# Patient Record
Sex: FEMALE | Race: OTHER | URBAN - METROPOLITAN AREA
[De-identification: names, ages, dates, MRNs, and addresses within clinical notes are randomized per-mention and may not be internally consistent; named-entity substitution may affect disease eponyms.]

---

## 2022-10-19 LAB
AMB EXT GLUCOSE 1HR OB: 111
ANTIBODY SCREEN OB: NEGATIVE
HEPATITIS B SURFACE ANTIGEN OB: NEGATIVE
HIV RESULT OB: NEGATIVE
RAPID PLASMA REAGIN OB: NONREACTIVE
RH FACTOR OB: POSITIVE

## 2022-12-24 ENCOUNTER — HOSPITAL ENCOUNTER (INPATIENT)
Facility: HOSPITAL | Age: 22
LOS: 3 days | Discharge: HOME | End: 2022-12-27
Attending: OBSTETRICS & GYNECOLOGY | Admitting: OBSTETRICS & GYNECOLOGY
Payer: MEDICAID

## 2022-12-24 PROBLEM — Z34.90 PREGNANCY: Status: ACTIVE | Noted: 2022-12-24

## 2022-12-24 LAB
BASOPHILS # BLD AUTO: 0.06 X10(3) UL (ref 0–0.2)
BASOPHILS NFR BLD AUTO: 0.5 %
DEPRECATED RDW RBC AUTO: 43.2 FL (ref 35.1–46.3)
EOSINOPHIL # BLD AUTO: 0.26 X10(3) UL (ref 0–0.7)
EOSINOPHIL NFR BLD AUTO: 2 %
ERYTHROCYTE [DISTWIDTH] IN BLOOD BY AUTOMATED COUNT: 13.2 % (ref 11–15)
HCT VFR BLD AUTO: 36.8 %
HGB BLD-MCNC: 12 G/DL
IMM GRANULOCYTES # BLD AUTO: 0.06 X10(3) UL (ref 0–1)
IMM GRANULOCYTES NFR BLD: 0.5 %
LYMPHOCYTES # BLD AUTO: 2 X10(3) UL (ref 1–4)
LYMPHOCYTES NFR BLD AUTO: 15.2 %
MCH RBC QN AUTO: 29.4 PG (ref 26–34)
MCHC RBC AUTO-ENTMCNC: 32.6 G/DL (ref 31–37)
MCV RBC AUTO: 90.2 FL
MONOCYTES # BLD AUTO: 0.88 X10(3) UL (ref 0.1–1)
MONOCYTES NFR BLD AUTO: 6.7 %
NEUTROPHILS # BLD AUTO: 9.87 X10 (3) UL (ref 1.5–7.7)
NEUTROPHILS # BLD AUTO: 9.87 X10(3) UL (ref 1.5–7.7)
NEUTROPHILS NFR BLD AUTO: 75.1 %
PLATELET # BLD AUTO: 339 10(3)UL (ref 150–450)
RBC # BLD AUTO: 4.08 X10(6)UL
WBC # BLD AUTO: 13.1 X10(3) UL (ref 4–11)

## 2022-12-24 PROCEDURE — 85025 COMPLETE CBC W/AUTO DIFF WBC: CPT | Performed by: OBSTETRICS & GYNECOLOGY

## 2022-12-24 PROCEDURE — 99204 OFFICE O/P NEW MOD 45 MIN: CPT

## 2022-12-24 PROCEDURE — 86900 BLOOD TYPING SEROLOGIC ABO: CPT | Performed by: OBSTETRICS & GYNECOLOGY

## 2022-12-24 PROCEDURE — 86901 BLOOD TYPING SEROLOGIC RH(D): CPT | Performed by: OBSTETRICS & GYNECOLOGY

## 2022-12-24 PROCEDURE — 86850 RBC ANTIBODY SCREEN: CPT | Performed by: OBSTETRICS & GYNECOLOGY

## 2022-12-24 RX ORDER — TRISODIUM CITRATE DIHYDRATE AND CITRIC ACID MONOHYDRATE 500; 334 MG/5ML; MG/5ML
30 SOLUTION ORAL AS NEEDED
Status: DISCONTINUED | OUTPATIENT
Start: 2022-12-24 | End: 2022-12-25

## 2022-12-24 RX ORDER — TERBUTALINE SULFATE 1 MG/ML
0.25 INJECTION, SOLUTION SUBCUTANEOUS AS NEEDED
Status: DISCONTINUED | OUTPATIENT
Start: 2022-12-24 | End: 2022-12-25

## 2022-12-24 RX ORDER — DEXTROSE, SODIUM CHLORIDE, SODIUM LACTATE, POTASSIUM CHLORIDE, AND CALCIUM CHLORIDE 5; .6; .31; .03; .02 G/100ML; G/100ML; G/100ML; G/100ML; G/100ML
INJECTION, SOLUTION INTRAVENOUS AS NEEDED
Status: DISCONTINUED | OUTPATIENT
Start: 2022-12-24 | End: 2022-12-25

## 2022-12-24 RX ORDER — SODIUM CHLORIDE, SODIUM LACTATE, POTASSIUM CHLORIDE, CALCIUM CHLORIDE 600; 310; 30; 20 MG/100ML; MG/100ML; MG/100ML; MG/100ML
INJECTION, SOLUTION INTRAVENOUS CONTINUOUS
Status: DISCONTINUED | OUTPATIENT
Start: 2022-12-24 | End: 2022-12-25

## 2022-12-24 RX ORDER — BUPIVACAINE HCL/0.9 % NACL/PF 0.25 %
5 PLASTIC BAG, INJECTION (ML) EPIDURAL AS NEEDED
Status: DISCONTINUED | OUTPATIENT
Start: 2022-12-24 | End: 2022-12-25

## 2022-12-24 RX ORDER — LIDOCAINE HYDROCHLORIDE 10 MG/ML
30 INJECTION, SOLUTION EPIDURAL; INFILTRATION; INTRACAUDAL; PERINEURAL ONCE
Status: DISCONTINUED | OUTPATIENT
Start: 2022-12-24 | End: 2022-12-25

## 2022-12-24 RX ORDER — CHOLECALCIFEROL (VITAMIN D3) 25 MCG
1 TABLET,CHEWABLE ORAL DAILY
COMMUNITY

## 2022-12-24 RX ORDER — BUPIVACAINE HYDROCHLORIDE 2.5 MG/ML
20 INJECTION, SOLUTION EPIDURAL; INFILTRATION; INTRACAUDAL ONCE
Status: DISCONTINUED | OUTPATIENT
Start: 2022-12-24 | End: 2022-12-25

## 2022-12-24 RX ORDER — SULFAMETHOXAZOLE AND TRIMETHOPRIM 800; 160 MG/1; MG/1
1 TABLET ORAL 2 TIMES DAILY
COMMUNITY
Start: 2022-12-23 | End: 2022-12-27

## 2022-12-24 RX ORDER — NALBUPHINE HCL 10 MG/ML
2.5 AMPUL (ML) INJECTION
Status: DISCONTINUED | OUTPATIENT
Start: 2022-12-24 | End: 2022-12-25

## 2022-12-24 RX ORDER — ONDANSETRON 2 MG/ML
4 INJECTION INTRAMUSCULAR; INTRAVENOUS EVERY 6 HOURS PRN
Status: DISCONTINUED | OUTPATIENT
Start: 2022-12-24 | End: 2022-12-25

## 2022-12-24 RX ORDER — IBUPROFEN 600 MG/1
600 TABLET ORAL EVERY 6 HOURS PRN
Status: DISCONTINUED | OUTPATIENT
Start: 2022-12-24 | End: 2022-12-25

## 2022-12-24 RX ORDER — AMMONIA INHALANTS 0.04 G/.3ML
0.3 INHALANT RESPIRATORY (INHALATION) AS NEEDED
Status: DISCONTINUED | OUTPATIENT
Start: 2022-12-24 | End: 2022-12-25

## 2022-12-24 RX ORDER — ACETAMINOPHEN 500 MG
500 TABLET ORAL EVERY 6 HOURS PRN
Status: DISCONTINUED | OUTPATIENT
Start: 2022-12-24 | End: 2022-12-25

## 2022-12-25 ENCOUNTER — ANESTHESIA (OUTPATIENT)
Dept: OBGYN UNIT | Facility: HOSPITAL | Age: 22
End: 2022-12-25
Payer: MEDICAID

## 2022-12-25 ENCOUNTER — ANESTHESIA EVENT (OUTPATIENT)
Dept: OBGYN UNIT | Facility: HOSPITAL | Age: 22
End: 2022-12-25
Payer: MEDICAID

## 2022-12-25 LAB
ANTIBODY SCREEN: NEGATIVE
RH BLOOD TYPE: POSITIVE
RH BLOOD TYPE: POSITIVE
SARS-COV-2 RNA RESP QL NAA+PROBE: NOT DETECTED

## 2022-12-25 PROCEDURE — 0UQGXZZ REPAIR VAGINA, EXTERNAL APPROACH: ICD-10-PCS | Performed by: OBSTETRICS & GYNECOLOGY

## 2022-12-25 RX ORDER — ACETAMINOPHEN 500 MG
500 TABLET ORAL EVERY 6 HOURS PRN
Status: DISCONTINUED | OUTPATIENT
Start: 2022-12-25 | End: 2022-12-27

## 2022-12-25 RX ORDER — LIDOCAINE HYDROCHLORIDE 10 MG/ML
INJECTION, SOLUTION EPIDURAL; INFILTRATION; INTRACAUDAL; PERINEURAL AS NEEDED
Status: DISCONTINUED | OUTPATIENT
Start: 2022-12-25 | End: 2022-12-25 | Stop reason: SURG

## 2022-12-25 RX ORDER — HYDROCODONE BITARTRATE AND ACETAMINOPHEN 5; 325 MG/1; MG/1
1 TABLET ORAL EVERY 6 HOURS PRN
Status: DISCONTINUED | OUTPATIENT
Start: 2022-12-25 | End: 2022-12-27

## 2022-12-25 RX ORDER — DIAPER,BRIEF,INFANT-TODD,DISP
1 EACH MISCELLANEOUS EVERY 6 HOURS PRN
Status: DISCONTINUED | OUTPATIENT
Start: 2022-12-25 | End: 2022-12-27

## 2022-12-25 RX ORDER — DOCUSATE SODIUM 100 MG/1
100 CAPSULE, LIQUID FILLED ORAL 2 TIMES DAILY
Status: DISCONTINUED | OUTPATIENT
Start: 2022-12-25 | End: 2022-12-27

## 2022-12-25 RX ORDER — BISACODYL 10 MG
10 SUPPOSITORY, RECTAL RECTAL ONCE AS NEEDED
Status: DISCONTINUED | OUTPATIENT
Start: 2022-12-25 | End: 2022-12-27

## 2022-12-25 RX ORDER — BUPIVACAINE HYDROCHLORIDE 2.5 MG/ML
20 INJECTION, SOLUTION EPIDURAL; INFILTRATION; INTRACAUDAL ONCE
Status: DISCONTINUED | OUTPATIENT
Start: 2022-12-25 | End: 2022-12-25

## 2022-12-25 RX ORDER — ACETAMINOPHEN 500 MG
1000 TABLET ORAL EVERY 6 HOURS PRN
Status: DISCONTINUED | OUTPATIENT
Start: 2022-12-25 | End: 2022-12-27

## 2022-12-25 RX ORDER — AMMONIA INHALANTS 0.04 G/.3ML
0.3 INHALANT RESPIRATORY (INHALATION) AS NEEDED
Status: DISCONTINUED | OUTPATIENT
Start: 2022-12-25 | End: 2022-12-27

## 2022-12-25 RX ORDER — ONDANSETRON 2 MG/ML
4 INJECTION INTRAMUSCULAR; INTRAVENOUS EVERY 6 HOURS PRN
Status: DISCONTINUED | OUTPATIENT
Start: 2022-12-25 | End: 2022-12-27

## 2022-12-25 RX ORDER — NALBUPHINE HCL 10 MG/ML
2.5 AMPUL (ML) INJECTION
Status: DISCONTINUED | OUTPATIENT
Start: 2022-12-25 | End: 2022-12-25

## 2022-12-25 RX ORDER — LIDOCAINE HYDROCHLORIDE AND EPINEPHRINE 15; 5 MG/ML; UG/ML
INJECTION, SOLUTION EPIDURAL AS NEEDED
Status: DISCONTINUED | OUTPATIENT
Start: 2022-12-25 | End: 2022-12-25 | Stop reason: SURG

## 2022-12-25 RX ORDER — SIMETHICONE 80 MG
80 TABLET,CHEWABLE ORAL 3 TIMES DAILY PRN
Status: DISCONTINUED | OUTPATIENT
Start: 2022-12-25 | End: 2022-12-27

## 2022-12-25 RX ORDER — IBUPROFEN 600 MG/1
600 TABLET ORAL EVERY 6 HOURS
Status: DISCONTINUED | OUTPATIENT
Start: 2022-12-25 | End: 2022-12-27

## 2022-12-25 RX ORDER — BUPIVACAINE HCL/0.9 % NACL/PF 0.25 %
5 PLASTIC BAG, INJECTION (ML) EPIDURAL AS NEEDED
Status: DISCONTINUED | OUTPATIENT
Start: 2022-12-25 | End: 2022-12-25

## 2022-12-25 RX ADMIN — LIDOCAINE HYDROCHLORIDE 5 ML: 10 INJECTION, SOLUTION EPIDURAL; INFILTRATION; INTRACAUDAL; PERINEURAL at 13:32:00

## 2022-12-25 RX ADMIN — LIDOCAINE HYDROCHLORIDE AND EPINEPHRINE 3 ML: 15; 5 INJECTION, SOLUTION EPIDURAL at 13:41:00

## 2022-12-25 NOTE — PLAN OF CARE
Problem: Patient Centered Care  Goal: Patient preferences are identified and integrated in the patient's plan of care  Description: Interventions:  - What would you like us to know as we care for you?  Baby Boy  - Provide timely, complete, and accurate information to patient/family  - Incorporate patient and family knowledge, values, beliefs, and cultural backgrounds into the planning and delivery of care  - Encourage patient/family to participate in care and decision-making at the level they choose  - Honor patient and family perspectives and choices  Outcome: Progressing   Patient's Long Term Goal:  Uncomplicated Vaginal Delivery    Interventions:  -Assessment  -Induction/Augmentation per protocol and MD order  -Education  -Intervention per protocol with education  -involve patient/family in POC  -See additional Care Plan goals for specific interventions    Patient's Short Term Goal:  Comfort and Pain Control    Interventions:  -Non Pharmacological pain interventions  -IV/IM and epidural pain medication per physician order and patient's request  -Education  -Involve patient in POC

## 2022-12-25 NOTE — ANESTHESIA PROCEDURE NOTES
Labor Analgesia    Date/Time: 12/25/2022 1:31 PM  Performed by: Marissa Urbina MD  Authorized by: Marissa Urbina MD       General Information and Staff    Start Time:  12/25/2022 1:31 PM  End Time:  12/25/2022 1:45 PM  Anesthesiologist:  Marissa Urbina MD  Performed by:   Anesthesiologist  Patient Location:  OB  Site Identification: surface landmarks    Reason for Block: labor epidural    Preanesthetic Checklist: patient identified, IV checked, site marked, risks and benefits discussed, monitors and equipment checked, pre-op evaluation, timeout performed, anesthesia consent and sterile technique used      Procedure Details    Patient Position:  Sitting  Prep: ChloraPrep    Monitoring:  Heart rate  Approach:  Midline    Epidural Needle    Injection Technique:  SUKH saline  Needle Type:  Tuohy  Needle Gauge:  18 G  Needle Length:  3.5 in  Needle Insertion Depth:  6  Location:  L2-3    Spinal Needle      Catheter    Catheter Type:  Multi-orifice  Catheter Size:  20 G  Catheter at Skin Depth:  11  Test Dose:  Negative    Assessment    Sensory Level:  T10    Additional Comments

## 2022-12-25 NOTE — OPERATIVE REPORT
[facility]    Vaginal Delivery Note    Hung Jessie Patient Status:  Inpatient    2000 MRN [id number]   Location [address] Attending Kaylynn Early MD   Hosp Day # 1 PCP No primary care provider on file.      Delivery     Infant  Date of Delivery: 2022   Time of Delivery: 4:59 PM  Delivery Type: Normal spontaneous vaginal delivery    Infant Sex/Birthweight: male 7 lb 7.6 oz (3.39 kg)     Presentation Vertex [1]  Position Left [1] Occiput [1] Anterior [1]    Apgars:  1 minute: 8               5 minutes: 9                        10 minutes:      Placenta  Date/Time of Delivery: 2022  5:01 PM   Delivery: spontaneous  Placenta to Pathology: no  Cord Gases Submitted: no  Cord Blood Collection: yes  Cord Tissue Collection: no  Cord Complications: single nuchal  Sponge and Needle Counts:  Verified yes    Maternal Anesthesia: epidural   Episiotomy/Laceration Repair  Location: vaginal lac bilateral rep w/ 30 vicryl w/ 10 cc 1% lidocaine    Delivery Complications  none    Neonatologist Present: no  Delivery Comment: charles    Intake/Output   EBL:  See chart  melly Bennett MD   2022  5:18 PM

## 2022-12-25 NOTE — PROGRESS NOTES
Pt is a 23year old female admitted to TR2/TR2-A. Patient presents with:  R/o Rom: Pt reports leaking fluid since , contractions every 5-7 minutes. Pt is  38w3d intra-uterine pregnancy. History obtained, consents signed. Oriented to room, staff, and plan of care.

## 2022-12-26 LAB
BASOPHILS # BLD AUTO: 0.07 X10(3) UL (ref 0–0.2)
BASOPHILS NFR BLD AUTO: 0.4 %
DEPRECATED RDW RBC AUTO: 43.8 FL (ref 35.1–46.3)
EOSINOPHIL # BLD AUTO: 0.14 X10(3) UL (ref 0–0.7)
EOSINOPHIL NFR BLD AUTO: 0.8 %
ERYTHROCYTE [DISTWIDTH] IN BLOOD BY AUTOMATED COUNT: 13.3 % (ref 11–15)
HCT VFR BLD AUTO: 30.1 %
HGB BLD-MCNC: 10.1 G/DL
IMM GRANULOCYTES # BLD AUTO: 0.1 X10(3) UL (ref 0–1)
IMM GRANULOCYTES NFR BLD: 0.6 %
LYMPHOCYTES # BLD AUTO: 2.63 X10(3) UL (ref 1–4)
LYMPHOCYTES NFR BLD AUTO: 15.1 %
MCH RBC QN AUTO: 30.4 PG (ref 26–34)
MCHC RBC AUTO-ENTMCNC: 33.6 G/DL (ref 31–37)
MCV RBC AUTO: 90.7 FL
MONOCYTES # BLD AUTO: 1.31 X10(3) UL (ref 0.1–1)
MONOCYTES NFR BLD AUTO: 7.5 %
NEUTROPHILS # BLD AUTO: 13.18 X10 (3) UL (ref 1.5–7.7)
NEUTROPHILS # BLD AUTO: 13.18 X10(3) UL (ref 1.5–7.7)
NEUTROPHILS NFR BLD AUTO: 75.6 %
PLATELET # BLD AUTO: 292 10(3)UL (ref 150–450)
RBC # BLD AUTO: 3.32 X10(6)UL
WBC # BLD AUTO: 17.4 X10(3) UL (ref 4–11)

## 2022-12-26 PROCEDURE — 85025 COMPLETE CBC W/AUTO DIFF WBC: CPT | Performed by: OBSTETRICS & GYNECOLOGY

## 2022-12-26 NOTE — LACTATION NOTE
LACTATION NOTE - MOTHER      Evaluation Type: Inpatient    Problems identified  Problems identified: Knowledge deficit;Milk supply not WNL  Milk supply not WNL: Reduced (potential)  Problems Identified Other: Formula supplement         Breastfeeding goal  Breastfeeding goal: To maintain breast milk feeding per patient goal    Maternal Assessment  Prior breastfeeding experience (comment below): Primip  Breastfeeding Assistance: Breastfeeding assistance provided with permission         Guidelines for use of:  Breast pump type: Ameda Platinum  Suggested use of pump: Pump if infant is not latching to breast;Pump each time a supplement is offered  Reported pumping volumes (ml): drops  Other (comment): Mom reports infant sleepy at breast, supplementing with formula and breast pump at bedside. Educated on guidelines for pumping and supplementing, and  BF behavior. Infant out for  testing, encouraged to call [facility] at next feeding for BF support.

## 2022-12-26 NOTE — ANESTHESIA POSTPROCEDURE EVALUATION
Patient: Storm Forward    Procedure Summary     Date: 12/25/22 Room / Location:     Anesthesia Start: 1331 Anesthesia Stop: 8022    Procedure: LABOR ANALGESIA Diagnosis:     Scheduled Providers:  Anesthesiologist: Polina Crum MD    Anesthesia Type: epidural ASA Status: 2 - Emergent          Anesthesia Type: epidural        [facility] AN Post Evaluation:   Patient Evaluated in PACU  Patient Participation: complete - patient participated  Level of Consciousness: awake  Pain Management: adequate  Airway Patency:patent  Dental exam unchanged from preop  Yes    Cardiovascular Status: acceptable  Respiratory Status: acceptable  Postoperative Hydration acceptable      Aniket Whitaker MD  12/26/2022 3:51 AM

## 2022-12-26 NOTE — PROGRESS NOTES
Received patient into room 359 via wheelchair . Bedside shift report received from ADA Davey. Pt transferred to bed. Bed in lowest and locked position. Side rails up x2. VSS. IV site unremarkable. Baby present in open crib. ID bands matched with L&D RN. Patient and family oriented to unit, room and call light within reach. Safety measures in place, POC followed. Will continue to monitor per protocol. Advised to call for assistance to bathroom.

## 2022-12-26 NOTE — CM/SW NOTE
SW self referral due to finances/WIC and [employer]    SW met with patient bedside. SW confirmed face sheet contact as correct. Pt endorses that she resides with her mother at address on face sheet,    Baby boy/girl name:Suresh Rae  Date & time of delivery:12/25/22 @ 4:59pm  Delivery method:Normal spontaneous vaginal delivery  Siblings age:n/a    Patient employed:Denied  Length of maternity leave:n/a    Father of baby employed:Yes  Length of paternity leave:Denied    Breast or formula feed:Breast and formula feed    Pediatrician:LASHELLD  SW encouraged pt to schedule infant first appointment (usually within 48 hours of discharge) prior to pt discharge. Pt expressed understanding. Infant Insurance:Medicaid  Change HC contacted:Yes    Mental Health History:Denied    Medications:n/a    Therapist:n/a    Psychiatrist:n/a    SW discussed signs, symptoms and risks associated with post partum depression & anxiety. SW provided pt with PMAD resources. Other resources provided:WIC and Teen Connection resources for [facility] (FAHEEM BARBOUR). Patient support system:FOB    Patient denied current questions/needs from SW.    SW/CM to remain available for support and/or discharge planning.       SARAH Liang, [facility]  Social Work   UNQ:#[phone]

## 2022-12-26 NOTE — PROGRESS NOTES
Patient up to bathroom with assist x 2. Voided 150cc. Patient transferred to mother/baby room 359 per wheelchair in stable condition with baby and personal belongings. Accompanied by significant other and staff. Report given to [facility].

## 2022-12-27 VITALS
HEIGHT: 63 IN | BODY MASS INDEX: 31.36 KG/M2 | TEMPERATURE: 99 F | RESPIRATION RATE: 16 BRPM | SYSTOLIC BLOOD PRESSURE: 107 MMHG | HEART RATE: 75 BPM | WEIGHT: 177 LBS | DIASTOLIC BLOOD PRESSURE: 67 MMHG | OXYGEN SATURATION: 97 %

## 2022-12-27 RX ORDER — PSEUDOEPHEDRINE HCL 30 MG
100 TABLET ORAL 2 TIMES DAILY
Qty: 30 CAPSULE | Refills: 0 | Status: SHIPPED | OUTPATIENT
Start: 2022-12-27

## 2022-12-27 RX ORDER — MELATONIN
325
Qty: 42 TABLET | Refills: 0 | Status: SHIPPED | OUTPATIENT
Start: 2022-12-27

## 2022-12-27 RX ORDER — IBUPROFEN 600 MG/1
600 TABLET ORAL EVERY 6 HOURS PRN
Qty: 20 TABLET | Refills: 0 | Status: SHIPPED | OUTPATIENT
Start: 2022-12-27

## 2022-12-27 RX ORDER — IBUPROFEN 600 MG/1
600 TABLET ORAL EVERY 6 HOURS PRN
Status: DISCONTINUED | OUTPATIENT
Start: 2022-12-27 | End: 2022-12-27

## 2022-12-27 NOTE — DISCHARGE INSTRUCTIONS
Pelvic rest. May shower no bath for 6 weeks. Stay nice and hydrated. Call OB if have: fever greater then 100.4, heavy vaginal bleeding(1 pad saturated in 1 hour), chest pain, shortness of breath or blurred vision. Or signs or symptoms of anxiety or depression. Parents support groups:  Cradle talk online (Monday & Friday at 10am)--- support group for any parents of a  in our community-- [employer]-- for any expectant or new mom who may be experiencing anxiety, stress or depression. It is lead by a licensed clinical therapist with the option of in-person or online meetings: In person meets once a month for Ranjeet and Jamir, registration is required  for either option and online ( and  of the month)  virtual on [employer]. For more information for either Cradle talk or Nurturing Mom or to receive a email invite contact [email or web address]. [employer]      In person Mom & Baby hour support group is every Wednesday from  am, at [address] at [address], [place] in [address] which is located just inside the front door and to the right. No Registration required just show up, for any new babies, doesn't need to be your first!!! For information for going in person--- Email [email or web address]. [employer]     Can always see information about in person group on the [address] [place] [address] for breast feeding and parent support, Website: [employer]  and for the [address] [place] [address] group and other groups visit https://[email or web address].com/pg/German/events/.  to help find a group, all meetings are virtual.     [address] [place] [address] Rd: [phone]   This service is provided by [facility], has a phone line dedicated women (or anyone worried about a women) who may be experiencing signs or symptoms of postpartum depression.       Facebook groups-  for more support when home- [address] [facility] --- you can find mom-to-mom advice and the list of speaker topics for cradle talk program.

## 2022-12-27 NOTE — LACTATION NOTE
LACTATION NOTE - MOTHER      Evaluation Type: Inpatient    Problems identified  Problems identified: Knowledge deficit;Milk supply not WNL  Milk supply not WNL: Reduced (potential)         Breastfeeding goal  Breastfeeding goal: To maintain breast milk feeding per patient goal    Maternal Assessment  Prior breastfeeding experience (comment below): Primip  Breastfeeding Assistance: [facility] assistance declined at this time    Pain assessment  Pain scale comment: denies    Guidelines for use of:  Breast pump type: Ameda Platinum  Suggested use of pump: Pump if infant is not latching to breast;Pump each time a supplement is offered              Mother of infant states that breastfeeding is going well, she is doing some supplementing with formula. Education complete on pumping and supplementing. Encouraged to call [facility] office if questions or concerns arise. Educated about all outpatient services.

## 2022-12-28 NOTE — DISCHARGE SUMMARY
[place] [facility]    Discharge Summary    Lakisha Friends Patient Status:  Inpatient    2000 MRN [id number]   Location [facility] 3SE Attending Alejandro Dumont MD   Hosp Day # 3 PCP No primary care provider on file. Date of Admission: 2022    Date of Discharge: 22  Discharge Time:     Admission Diagnoses: ROR  Pregnancy    Secondary Diagnosis:     Primary OB Clinician: [facility] Course:     EDC: Estimated Date of Delivery: 23    Gestational Age: 38w4d    Date of Delivery: 22  Time of Delivery:     Antepartum complications: none    Delivered By: Nallely Vega MD;  Delivery Type:     Tubal Ligation: n/a    Baby: Liveborn ,     Apgars:  1 minute:                    5 minutes:                           10 minutes:      Anesthesia:           Intrapartum Complications: labor at 38 4/7    Laceration: vaginal    Episiotomy: none    Placenta: spontaneous    Feeding Method:     Rh Immune Globulin Given:     Rubella Vaccine Given:         Discharge Plan:   Discharge Condition: Stable  Early Discharge:  NO    Discharge medications:  Current Discharge Medication List    New Orders    docusate sodium 100 MG Oral Cap  Take 100 mg by mouth 2 (two) times daily. ibuprofen 600 MG Oral Tab  Take 1 tablet (600 mg total) by mouth every 6 (six) hours as needed for Fever. ferrous sulfate 325 (65 FE) MG Oral Tab EC  Take 1 tablet (325 mg total) by mouth daily with breakfast.      Home Meds - Unchanged    prenatal vitamin with DHA 27-0.8-228 MG Oral Cap  Take 1 capsule by mouth daily. Discharge Diet: As tolerated    Discharge Activity: As tolerated    Follow up: Follow-up [address] [facility]. Call. Specialty: Pediatrics  Why: As needed  Contact information:  [address] [facility] [address] [phone]           Alejandro Dumont MD Follow up in 6 week(s).     Specialty: OBSTETRICS & GYNECOLOGY  Contact information:  [address] [address] [facility] [place]  [address] [phone]                         Follow up Labs:  in           Other Discharge Instructions:        Pelvic rest. May shower no bath for 6 weeks. Stay nice and hydrated. Call OB if have: fever greater then 100.4, heavy vaginal bleeding(1 pad saturated in 1 hour), chest pain, shortness of breath or blurred vision. Or signs or symptoms of anxiety or depression. Parents support groups:  Cradle talk online (Monday & Friday at 10am)--- support group for any parents of a  in our community-- [employer]-- for any expectant or new mom who may be experiencing anxiety, stress or depression. It is lead by a licensed clinical therapist with the option of in-person or online meetings: In person meets once a month for [place] [employer], registration is required  for either option and online ( and  of the month)  virtual on [employer]. For more information for either Cradle talk or Nurturing Mom or to receive a email invite contact [email or web address]      In person Mom & Baby hour support group is every Wednesday from  am, at [address] at [address], [place] in the [address] which is located just inside the front door and to the right. No Registration required just show up, for any new babies, doesn't need to be your first!!! For information for going in person--- Email [email or web address]     Can always see information about in person group on the [address] [place] [address] for breast feeding and parent support, Website: [employer]  and for the [address] [place] [address] group and other groups visit https://www. facebook.com/pg/German/events/.  to help find a group, all meetings are virtual.     [address] [place] [address] Rd: [phone]   This service is provided by [facility], has a phone line dedicated women (or anyone worried about a women) who may be experiencing signs or symptoms of postpartum depression.       Facebook groups-  for more support when home- [address] [facility] --- you can find mom-to-mom advice and the list of speaker topics for cradle talk program.                 Lesly Mcdonald MD  12/27/2022

## 2022-12-28 NOTE — PLAN OF CARE
Addended by: DEISY LISA on: 1/6/2017 11:59 AM     Modules accepted: Orders     Problem: Patient Centered Care  Goal: Patient preferences are identified and integrated in the patient's plan of care  Description: Interventions:  - What would you like us to know as we care for you? To be ready for discharge  - Provide timely, complete, and accurate information to patient/family  - Incorporate patient and family knowledge, values, beliefs, and cultural backgrounds into the planning and delivery of care  - Encourage patient/family to participate in care and decision-making at the level they choose  - Honor patient and family perspectives and choices  Outcome: Completed     Problem: Patient/Family Goals  Goal: Patient/Family Long Term Goal  Description: Patient's Long Term Goal: independent in adls    Interventions:  - pain control  - See additional Care Plan goals for specific interventions  Outcome: Completed  Goal: Patient/Family Short Term Goal  Description: Patient's Short Term Goal: pain well managed    Interventions:   - normal lochia  - See additional Care Plan goals for specific interventions  Outcome: Completed     Problem: POSTPARTUM  Goal: Long Term Goal:Experiences normal postpartum course  Description: INTERVENTIONS:  - Assess and monitor vital signs and lab values. - Assess fundus and lochia. - Provide ice/sitz baths for perineum discomfort. - Monitor healing of incision/episiotomy/laceration, and assess for signs and symptoms of infection and hematoma. - Assess bladder function and monitor for bladder distention.  - Provide/instruct/assist with pericare as needed. - Provide VTE prophylaxis as needed. - Monitor bowel function.  - Encourage ambulation and provide assistance as needed. - Assess and monitor emotional status and provide social service/psych resources as needed. - Utilize standard precautions and use personal protective equipment as indicated.  Ensure aseptic care of all intravenous lines and invasive tubes/drains.  - Obtain immunization and exposure to communicable diseases history. Outcome: Completed  Goal: Optimize infant feeding at the breast  Description: INTERVENTIONS:  - Initiate breast feeding within first hour after birth. - Monitor effectiveness of current breast feeding efforts. - Assess support systems available to mother/family.  - Identify cultural beliefs/practices regarding lactation, letdown techniques, maternal food preferences. - Assess mother's knowledge and previous experience with breast feeding.  - Provide information as needed about early infant feeding cues (e.g., rooting, lip smacking, sucking fingers/hand) versus late cue of crying.  - Discuss/demonstrate breast feeding aids (e.g., infant sling, nursing footstool/pillows, and breast pumps). - Encourage mother/other family members to express feelings/concerns, and actively listen. - Educate father/SO about benefits of breast feeding and how to manage common lactation challenges. - Recommend avoidance of specific medications or substances incompatible with breast feeding.  - Assess and monitor for signs of nipple pain/trauma. - Instruct and provide assistance with proper latch. - Review techniques for milk expression (breast pumping) and storage of breast milk. Provide pumping equipment/supplies, instructions and assistance, as needed. - Encourage rooming-in and breast feeding on demand.  - Encourage skin-to-skin contact. - Provide LC support as needed. - Assess for and manage engorgement. - Provide breast feeding education handouts and information on community breast feeding support. Outcome: Completed  Goal: Establishment of adequate milk supply with medication/procedure interruptions  Description: INTERVENTIONS:  - Review techniques for milk expression (breast pumping). - Provide pumping equipment/supplies, instructions, and assistance until it is safe to breastfeed infant.   Outcome: Completed  Goal: Experiences normal breast weaning course  Description: INTERVENTIONS:  - Assess for and manage engorgement. - Instruct on breast care. - Provide comfort measures. Outcome: Completed  Goal: Appropriate maternal -  bonding  Description: INTERVENTIONS:  - Assess caregiver- interactions. - Assess caregiver's emotional status and coping mechanisms. - Encourage caregiver to participate in  daily care. - Assess support systems available to mother/family.  - Provide /case management support as needed.   Outcome: Completed

## 2023-01-16 ENCOUNTER — TELEPHONE (OUTPATIENT)
Dept: OBGYN UNIT | Facility: HOSPITAL | Age: 23
End: 2023-01-16